# Patient Record
Sex: MALE | Race: WHITE | NOT HISPANIC OR LATINO | Employment: UNEMPLOYED | URBAN - METROPOLITAN AREA
[De-identification: names, ages, dates, MRNs, and addresses within clinical notes are randomized per-mention and may not be internally consistent; named-entity substitution may affect disease eponyms.]

---

## 2019-09-06 ENCOUNTER — HOSPITAL ENCOUNTER (EMERGENCY)
Facility: HOSPITAL | Age: 34
Discharge: HOME | End: 2019-09-06
Attending: EMERGENCY MEDICINE
Payer: MEDICAID

## 2019-09-06 VITALS
DIASTOLIC BLOOD PRESSURE: 86 MMHG | WEIGHT: 215 LBS | HEIGHT: 75 IN | BODY MASS INDEX: 26.73 KG/M2 | SYSTOLIC BLOOD PRESSURE: 128 MMHG | TEMPERATURE: 98 F | OXYGEN SATURATION: 100 % | RESPIRATION RATE: 18 BRPM | HEART RATE: 80 BPM

## 2019-09-06 DIAGNOSIS — S99.919A ANKLE INJURY, INITIAL ENCOUNTER: ICD-10-CM

## 2019-09-06 DIAGNOSIS — S99.921A FOOT INJURY, RIGHT, INITIAL ENCOUNTER: ICD-10-CM

## 2019-09-06 DIAGNOSIS — S92.351A CLOSED DISPLACED FRACTURE OF FIFTH METATARSAL BONE OF RIGHT FOOT, INITIAL ENCOUNTER: Primary | ICD-10-CM

## 2019-09-06 DIAGNOSIS — S89.91XA LEG INJURY, RIGHT, INITIAL ENCOUNTER: ICD-10-CM

## 2019-09-06 PROCEDURE — 29515 APPLICATION SHORT LEG SPLINT: CPT | Mod: RT,ER

## 2019-09-06 PROCEDURE — 25000003 PHARM REV CODE 250: Mod: ER | Performed by: NURSE PRACTITIONER

## 2019-09-06 PROCEDURE — 99284 EMERGENCY DEPT VISIT MOD MDM: CPT | Mod: 25,ER

## 2019-09-06 RX ORDER — IBUPROFEN 600 MG/1
600 TABLET ORAL
Status: COMPLETED | OUTPATIENT
Start: 2019-09-06 | End: 2019-09-06

## 2019-09-06 RX ORDER — IBUPROFEN 600 MG/1
600 TABLET ORAL EVERY 6 HOURS PRN
Qty: 30 TABLET | Refills: 0 | Status: SHIPPED | OUTPATIENT
Start: 2019-09-06

## 2019-09-06 RX ORDER — ACETAMINOPHEN AND CODEINE PHOSPHATE 300; 30 MG/1; MG/1
1 TABLET ORAL EVERY 6 HOURS PRN
Qty: 12 TABLET | Refills: 0 | Status: SHIPPED | OUTPATIENT
Start: 2019-09-06

## 2019-09-06 RX ADMIN — IBUPROFEN 600 MG: 600 TABLET, FILM COATED ORAL at 05:09

## 2019-09-06 NOTE — ED PROVIDER NOTES
Encounter Date: 9/6/2019    SCRIBE #1 NOTE: I, Cherisedeb Lazarokathy, am scribing for, and in the presence of,  Zaynab Aranda NP . I have scribed the following portions of the note - Other sections scribed: HPI, ROS, PE .       History     Chief Complaint   Patient presents with    Foot Injury     pt states that he slipped 2 foot off of a ladder and landed awkward on his right foot 3 days ago. pt states that its not getting better. abvious brusing noted.      33 y.o male presents to the ED with right foot pain and right lower leg pain after he fell 2 feet off a ladder 3 days ago. No LOC or head injury. He denies numbness, weakness, tingling, or gait disturbance.    The history is provided by the patient.     Review of patient's allergies indicates:  No Known Allergies  History reviewed. No pertinent past medical history.  History reviewed. No pertinent surgical history.  History reviewed. No pertinent family history.  Social History     Tobacco Use    Smoking status: Never Smoker   Substance Use Topics    Alcohol use: Yes    Drug use: Never     Review of Systems   Constitutional: Negative for activity change, chills, diaphoresis and fever.   HENT: Negative for congestion, drooling, ear pain, rhinorrhea, sneezing, sore throat and trouble swallowing.    Eyes: Negative for pain.   Respiratory: Negative for cough, chest tightness, shortness of breath, wheezing and stridor.    Cardiovascular: Negative for chest pain, palpitations and leg swelling.   Gastrointestinal: Negative for abdominal distention, abdominal pain, constipation, diarrhea, nausea and vomiting.   Genitourinary: Negative for difficulty urinating, dysuria, frequency and urgency.   Musculoskeletal: Positive for arthralgias (right foot). Negative for back pain, myalgias, neck pain and neck stiffness.   Skin: Negative for pallor, rash and wound.   Neurological: Negative for dizziness, syncope, weakness, light-headedness, numbness and headaches.   All other systems  reviewed and are negative.      Physical Exam     Initial Vitals [09/06/19 1656]   BP Pulse Resp Temp SpO2   (!) 148/58 75 18 98.4 °F (36.9 °C) 99 %      MAP       --         Physical Exam    Nursing note and vitals reviewed.  Constitutional: He appears well-developed.   HENT:   Head: Normocephalic and atraumatic.   Right Ear: External ear normal.   Left Ear: External ear normal.   Nose: Nose normal.   Mouth/Throat: Oropharynx is clear and moist.   Eyes: Conjunctivae are normal.   Neck: Normal range of motion. Neck supple.   Cardiovascular: Normal rate, regular rhythm, normal heart sounds and intact distal pulses. Exam reveals no gallop and no friction rub.    No murmur heard.  Pulmonary/Chest: Effort normal and breath sounds normal. No respiratory distress.   Abdominal: Soft. Bowel sounds are normal.   Musculoskeletal: He exhibits no edema.        Right foot: There is tenderness (generalized ). There is normal range of motion, no bony tenderness, no swelling, normal capillary refill, no crepitus, no deformity and no laceration.        Feet:    Neurological: He is alert and oriented to person, place, and time.   Reflex Scores:       Patellar reflexes are 2+ on the right side and 2+ on the left side.  Skin: Skin is warm and dry. No rash noted.   Psychiatric: He has a normal mood and affect.         ED Course   Splint Application  Date/Time: 9/6/2019 6:00 PM  Performed by: ARMIDA James  Authorized by: Sandra Ceja MD   Location details: right leg  Splint type: short leg  Post-procedure: The splinted body part was neurovascularly unchanged following the procedure.  Patient tolerance: Patient tolerated the procedure well with no immediate complications        Labs Reviewed - No data to display       Imaging Results    None       Imaging Results          X-Ray Foot Complete Right (Final result)  Result time 09/06/19 17:26:11    Final result by Stormy Madison MD (09/06/19 17:26:11)                  Impression:      Acute fracture of the 5th metatarsal.      Electronically signed by: Stormy Madison  Date:    09/06/2019  Time:    17:26             Narrative:    EXAMINATION:  THREE VIEWS OF THE RIGHT FOOT    CLINICAL HISTORY:  Unspecified injury of right foot, initial encounter    TECHNIQUE:  AP, lateral, and oblique view of the right foot    COMPARISON:  None.    FINDINGS:  Mild dorsal soft tissue swelling is seen at the forefoot.  Three views of the right foot demonstrate there is an oblique minimally comminuted fracture 5th metatarsal.                               X-Ray Ankle Complete Right (Final result)  Result time 09/06/19 17:31:41    Final result by Danny Wheat MD (09/06/19 17:31:41)                 Impression:      1. No acute displaced fracture or dislocation of the ankle.  2. Partially visualized comminuted and displaced fracture of the 5th metatarsal, please see separately dictated radiograph of the foot..      Electronically signed by: Danny Wheat MD  Date:    09/06/2019  Time:    17:31             Narrative:    EXAMINATION:  XR ANKLE COMPLETE 3 VIEW RIGHT    CLINICAL HISTORY:  Unspecified injury of unspecified ankle, initial encounter    TECHNIQUE:  AP, lateral, and oblique images of the right ankle were performed.    COMPARISON:  None    FINDINGS:  Three views right ankle.    No acute displaced fracture or dislocation of the ankle.  No radiopaque foreign body.  There is mild anterior edema.  The ankle mortise is intact.    There is a partially imaged displaced fracture of the 5th metatarsal.                               X-Ray Tibia Fibula 2 View Right (Final result)  Result time 09/06/19 17:30:55    Final result by Danny Wheat MD (09/06/19 17:30:55)                 Impression:      As above      Electronically signed by: Danny Wheat MD  Date:    09/06/2019  Time:    17:30             Narrative:    EXAMINATION:  XR TIBIA FIBULA 2 VIEW RIGHT    CLINICAL HISTORY:  Unspecified  injury of right lower leg, initial encounter    TECHNIQUE:  AP and lateral views of the right tibia and fibula were performed.    COMPARISON:  None.    FINDINGS:  Four views right tibia fibula.    No acute displaced fracture or dislocation of the tibia or fibula.  No radiopaque foreign body.  The ankle is intact.  There may be minimal edema about the anterior proximal calf.                                  Medical Decision Making:   Initial Assessment:   33 y.o male presents to the ED with right foot pain and right lower leg pain after he fell 2 feet off a ladder 3 days ago. No LOC or head injury. He denies numbness, weakness, tingling, or gait disturbance.    Differential Diagnosis:   Foot sprain, Foot fracture, ankle sprain, ankle fracture   Clinical Tests:   Radiological Study: Ordered and Reviewed  ED Management:  Medicated with Motrin as needed for pain.   Short leg posterior splint and crutches.  Referred for Ortho for follow-up next week.   X-rays placed on disc.  Tylenol with codeine and Motrin as needed for pain.            Scribe Attestation:   Scribe #1: I performed the above scribed service and the documentation accurately describes the services I performed. I attest to the accuracy of the note.    This document was produced by a scribe under my direction and in my presence. I agree with the content of the note and have made any necessary edits.     ARMIDA Peters    09/06/2019 7:36 PM           Clinical Impression:     1. Closed displaced fracture of fifth metatarsal bone of right foot, initial encounter    2. Foot injury, right, initial encounter    3. Ankle injury, initial encounter    4. Leg injury, right, initial encounter                                   ARMIDA James  09/06/19 1936